# Patient Record
Sex: MALE | Race: BLACK OR AFRICAN AMERICAN
[De-identification: names, ages, dates, MRNs, and addresses within clinical notes are randomized per-mention and may not be internally consistent; named-entity substitution may affect disease eponyms.]

---

## 2018-02-20 ENCOUNTER — HOSPITAL ENCOUNTER (EMERGENCY)
Dept: HOSPITAL 74 - JER | Age: 38
Discharge: HOME | End: 2018-02-20
Payer: SELF-PAY

## 2018-02-20 VITALS — TEMPERATURE: 99.2 F | HEART RATE: 120 BPM | DIASTOLIC BLOOD PRESSURE: 91 MMHG | SYSTOLIC BLOOD PRESSURE: 129 MMHG

## 2018-02-20 VITALS — BODY MASS INDEX: 55.3 KG/M2
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
## 2018-02-20 DIAGNOSIS — S09.8XXA: Primary | ICD-10-CM
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
